# Patient Record
Sex: FEMALE | Race: WHITE | URBAN - METROPOLITAN AREA
[De-identification: names, ages, dates, MRNs, and addresses within clinical notes are randomized per-mention and may not be internally consistent; named-entity substitution may affect disease eponyms.]

---

## 2020-07-30 ENCOUNTER — OFFICE VISIT (OUTPATIENT)
Dept: URBAN - METROPOLITAN AREA CLINIC 40 | Facility: CLINIC | Age: 65
End: 2020-07-30
Payer: COMMERCIAL

## 2020-07-30 DIAGNOSIS — K57.10 SMALL BOWEL DIVERTICULAR DISEASE: ICD-10-CM

## 2020-07-30 DIAGNOSIS — R10.9 ABDOMINAL PAIN: ICD-10-CM

## 2020-07-30 DIAGNOSIS — K22.4 ESOPHAGEAL SPASM: ICD-10-CM

## 2020-07-30 DIAGNOSIS — Z12.11 ENCOUNTER FOR SCREENING COLONOSCOPY: ICD-10-CM

## 2020-07-30 DIAGNOSIS — K57.90 DIVERTICULOSIS: ICD-10-CM

## 2020-07-30 PROCEDURE — 99203 OFFICE O/P NEW LOW 30 MIN: CPT | Performed by: INTERNAL MEDICINE

## 2020-07-30 PROCEDURE — G8427 DOCREV CUR MEDS BY ELIG CLIN: HCPCS | Performed by: INTERNAL MEDICINE

## 2020-07-30 PROCEDURE — 3017F COLORECTAL CA SCREEN DOC REV: CPT | Performed by: INTERNAL MEDICINE

## 2020-07-30 RX ORDER — SODIUM, POTASSIUM,MAG SULFATES 17.5-3.13G
ONCE SOLUTION, RECONSTITUTED, ORAL ORAL
Qty: 1 KIT | Refills: 0 | OUTPATIENT
Start: 2020-07-30 | End: 2020-07-31

## 2020-07-30 RX ORDER — DICYCLOMINE HYDROCHLORIDE 10 MG/1
1 TABLET CAPSULE ORAL BID PRN
Qty: 60 | Refills: 0 | OUTPATIENT
Start: 2020-07-30 | End: 2020-08-29

## 2020-07-30 NOTE — HPI-TODAY'S VISIT:
Ms. Marr is a 64-year-old white female who comes to the office today to schedule screening colonoscopy.  She reports a normal colonoscopy 10 to 15 years ago.  History of bladder cancer and history of rectovaginal repair surgery and hernia surgery, umbilical, years ago.  In this region she notices some distention, likely hernia.  Recent abdominal pain prompted her PCP to order a an upper GI series as well as small bowel follow-through with no acute findings.  Small bowel appeared normal.  However she did have evidence of likely esophageal spasm as well as few diverticula of the small bowel.  No evidence of active inflammation, stricturing, obstruction.  She does report diverticulosis noted on her last colonoscopy.  Recently, complaints of alternating bowel habits with loose and firm stool.  Denies rectal bleeding or melena.  Stressors and anxiety increased lately.  No vomiting, fever or chills.  Denies dysphasia.  Occasiona  heartburn symptoms.  Bloating and gas with midabdominal pain and cramping.  She is a former smoker.  No alcohol consumption.  Takes Kaopectate and Pepto-Bismol as needed for GI upset.  Not currently on any medication for her anxiety.  However, she does take multivitamins as well as medications for her allergies recently. Is also on an omega-3 supplement.  No other complaints.

## 2020-07-30 NOTE — PHYSICAL EXAM GASTROINTESTINAL
Abdomen , soft, nontender, nondistended , no guarding or rigidity , no masses palpable , normal bowel sounds.  Reducible ventral present. Liver and Spleen , no hepatomegaly present , no hepatosplenomegaly , liver nontender , spleen not palpable

## 2020-08-11 ENCOUNTER — OFFICE VISIT (OUTPATIENT)
Dept: URBAN - METROPOLITAN AREA SURGERY CENTER 30 | Facility: SURGERY CENTER | Age: 65
End: 2020-08-11
Payer: COMMERCIAL

## 2020-08-11 DIAGNOSIS — Z12.11 ENCOUNTER FOR COLONOSCOPY DUE TO HISTORY OF ADENOMATOUS COLONIC POLYPS: ICD-10-CM

## 2020-08-11 PROCEDURE — G9935 CANC NOT DETECTD DURING SRCN: HCPCS | Performed by: INTERNAL MEDICINE

## 2020-08-11 PROCEDURE — G8907 PT DOC NO EVENTS ON DISCHARG: HCPCS | Performed by: INTERNAL MEDICINE

## 2020-08-11 PROCEDURE — G0121 COLON CA SCRN NOT HI RSK IND: HCPCS | Performed by: INTERNAL MEDICINE

## 2020-08-31 ENCOUNTER — OFFICE VISIT (OUTPATIENT)
Dept: URBAN - METROPOLITAN AREA CLINIC 40 | Facility: CLINIC | Age: 65
End: 2020-08-31
Payer: COMMERCIAL

## 2020-08-31 ENCOUNTER — DASHBOARD ENCOUNTERS (OUTPATIENT)
Age: 65
End: 2020-08-31

## 2020-08-31 DIAGNOSIS — K64.1 GRADE II HEMORRHOIDS: ICD-10-CM

## 2020-08-31 PROCEDURE — G8419 CALC BMI OUT NRM PARAM NOF/U: HCPCS | Performed by: INTERNAL MEDICINE

## 2020-08-31 PROCEDURE — 3017F COLORECTAL CA SCREEN DOC REV: CPT | Performed by: INTERNAL MEDICINE

## 2020-08-31 PROCEDURE — G8427 DOCREV CUR MEDS BY ELIG CLIN: HCPCS | Performed by: INTERNAL MEDICINE

## 2020-08-31 PROCEDURE — 1036F TOBACCO NON-USER: CPT | Performed by: INTERNAL MEDICINE

## 2020-08-31 PROCEDURE — 99212 OFFICE O/P EST SF 10 MIN: CPT | Performed by: INTERNAL MEDICINE

## 2020-08-31 NOTE — HPI-TODAY'S VISIT:
Ms. Marr is a 64-year-old white female who comes to the office today to schedule screening colonoscopy.  She reports a normal colonoscopy 10 to 15 years ago.  History of bladder cancer and history of rectovaginal repair surgery and hernia surgery, umbilical, years ago.  In this region she notices some distention, likely hernia.  Recent abdominal pain prompted her PCP to order a an upper GI series as well as small bowel follow-through with no acute findings.  Small bowel appeared normal.  However she did have evidence of likely esophageal spasm as well as few diverticula of the small bowel.  No evidence of active inflammation, stricturing, obstruction.  She does report diverticulosis noted on her last colonoscopy.  Recently, complaints of alternating bowel habits with loose and firm stool.  Denies rectal bleeding or melena.  Stressors and anxiety increased lately.  No vomiting, fever or chills.  Denies dysphasia.  Occasiona  heartburn symptoms.  Bloating and gas with midabdominal pain and cramping.  She is a former smoker.  No alcohol consumption.  Takes Kaopectate and Pepto-Bismol as needed for GI upset.  Not currently on any medication for her anxiety.  However, she does take multivitamins as well as medications for her allergies recently. Is also on an omega-3 supplement.  No other complaints.  Colonoscopy negative the other day, minimal diverticulosis only, no polyps or inflammation. She has been trying Florastar.

## 2024-07-24 ENCOUNTER — LAB OUTSIDE AN ENCOUNTER (OUTPATIENT)
Dept: URBAN - METROPOLITAN AREA CLINIC 74 | Facility: CLINIC | Age: 69
End: 2024-07-24

## 2024-07-24 ENCOUNTER — OFFICE VISIT (OUTPATIENT)
Dept: URBAN - METROPOLITAN AREA CLINIC 74 | Facility: CLINIC | Age: 69
End: 2024-07-24
Payer: MEDICARE

## 2024-07-24 VITALS
BODY MASS INDEX: 18.78 KG/M2 | SYSTOLIC BLOOD PRESSURE: 142 MMHG | WEIGHT: 110 LBS | DIASTOLIC BLOOD PRESSURE: 82 MMHG | HEART RATE: 72 BPM | TEMPERATURE: 97.2 F | HEIGHT: 64 IN | OXYGEN SATURATION: 98 %

## 2024-07-24 DIAGNOSIS — K57.10 SMALL BOWEL DIVERTICULAR DISEASE: ICD-10-CM

## 2024-07-24 DIAGNOSIS — R10.13 EPIGASTRIC ABDOMINAL PAIN: ICD-10-CM

## 2024-07-24 DIAGNOSIS — R10.11 ABDOMINAL PAIN, RUQ: ICD-10-CM

## 2024-07-24 PROBLEM — 235919008: Status: ACTIVE | Noted: 2024-07-24

## 2024-07-24 PROCEDURE — 99204 OFFICE O/P NEW MOD 45 MIN: CPT | Performed by: INTERNAL MEDICINE

## 2024-07-24 RX ORDER — SUCRALFATE 1 G/1
1 TABLET ON AN EMPTY STOMACH TABLET ORAL TWICE A DAY
Qty: 60 TABLET | Refills: 3 | OUTPATIENT
Start: 2024-07-24 | End: 2024-11-20

## 2024-07-24 RX ORDER — TRAZODONE HYDROCHLORIDE 100 MG/1
1 TABLET AT BEDTIME TABLET ORAL ONCE A DAY
Status: ON HOLD | COMMUNITY

## 2024-07-24 RX ORDER — PANTOPRAZOLE SODIUM 40 MG/1
1 TABLET TABLET, DELAYED RELEASE ORAL ONCE A DAY
Status: ACTIVE | COMMUNITY

## 2024-07-24 NOTE — PHYSICAL EXAM HENT:
Head, normocephalic, atraumatic, Face, Face within normal limits, Ears, External ears within normal limits, Nose/Nasopharynx, External nose normal appearance, nares patent, no nasal discharge, Mouth and Throat, Oral cavity appearance normal, Lips, Appearance normal Problem: Pain:  Goal: Pain level will decrease  Description: Pain level will decrease  Outcome: Met This Shift  Goal: Control of acute pain  Description: Control of acute pain  Outcome: Met This Shift  Goal: Control of chronic pain  Description: Control of chronic pain  Outcome: Met This Shift

## 2024-07-24 NOTE — PHYSICAL EXAM GASTROINTESTINAL
Abdomen , soft, nontender, nondistended , no guarding or rigidity , no masses palpable , normal bowel sounds , Liver and Spleen , no hepatomegaly present , no hepatosplenomegaly , liver nontender , spleen not palpable <-- Click to add NO significant Past Surgical History

## 2024-07-24 NOTE — HPI-TODAY'S VISIT:
Patient has not been seen since 2020. See below for full discussion but patient presents with a 6-month history of stress, epigastric and right upper quadrant abdominal pain, weight loss.  Her pain is described mainly as a dull crampy pain in the upper abdomen and right upper quadrant, occasionally radiating to the back, usually worse with she eats.  Her  states she only eats berries and broth.  She has lost about 10 to 15 pounds.  She denies nausea vomiting.  Her bowels are essentially within normal limits but occasional diarrhea and constipation consistent with her mixed IBS.  No rectal bleeding or melena.  She is now on Protonix and Zoloft.  She tried trazodone but stopped due to serotonin side effects.  She has been on these medicines only for a few days via PCP.  She was seen at [facility] 2023 for small bowel obstruction.  She was seen by GI specialist at that time as well as surgery.  She had gallstones but no evidence of cholecystitis.  Small liver cyst.  Small bowel obstruction appears to be due to an incarcerated right inguinal hernia.  She was seen by Dr. Cody Martin.  She had laparoscopic inguinal hernia repair by Dr. Thacker at the hospital.  She did well at that time.  She did not have a small bowel resection. -- Labs January 2024, CMP normal, no CBC at that time.  CBC was normal last year 2023.  ======== Narrative & ImpressionDOB:  1955 Study Performed: CT Abdomen and Pelvis with IV contrast. Clinical Indication:  Abdominal pain, acute, nonlocalized, vomiting Comparison:  None. Technique:  There are contiguous images through the abdomen and pelvis after IV contrast. Dose reduction techniques were utilized.  Findings:  Dependent change in the lung bases. No aggressive bony lesions. Period there is some disc bulge seen at L4-5 and there is disc osteophyte at L5-S1. Liver is without biliary dilatation. There are numerous gallstones but there is no gallbladder wall thickening or pericholecystic fluid. Several hypodensities are seen in the liver too small to further characterize The spleen, pancreas, left and right adrenal gland and left and right kidney are normal. No renal stones and no hydronephrosis. No enlarged retroperitoneal nodes. Sclerotic abdominal aortic and iliac calcification. Dilated small bowel loops are seen beginning with some proximal jejunal loops. These loops measure up to 3.9 cm in diameter. The transition point is due to what is likely an incarcerated right inguinal hernia into which small bowel protrudes. There is a dilated knuckle small bowel within the hernia. The transition is leading up to the hernia and the bowel exiting the hernia is decompressed. The distal small bowel is decompressed. No ascites/free fluid. There is diverticulosis of the sigmoid. Colon is collapsed. The appendix is not seen IMPRESSION:.         . SMALL BOWEL OBSTRUCTION WHICH APPEARS TO BE DUE TO AN INCARCERATED RIGHT INGUINAL HERNIA INTO WHICH THE SMALL BOWEL PROTRUDES. DECOMPRESSED SMALL BOWEL DISTAL TO THIS. THE PROXIMAL SMALL BOWEL MEASURES UP TO 3.9 CM IN DIAMETER. NO FREE AIR OR FREE FLUID Gallstones but no CT evidence of cholecystitis Several small hypodensities in the liver seen too small to further characterize Dependent change in the lung bases. Areas of disc bulge at L4-5 and L5-S1 Released By: LUDY NIELSON MD  6/27/2023 2:03 PM ======== --Colonoscopy 2020 negative, minimal diverticulosis only, no polyps or inflammation. Next due 10 years, 2030. --Ms. Marr is a 68-year-old white female with hx of IBS. Prior GI workup with colonoscopy and an upper GI series as well as small bowel follow-through with no acute findings.  Small bowel appeared normal. --Recently, complaints of alternating bowel habits with loose and firm stool.  Denies rectal bleeding or melena.  Stressors and anxiety increased lately.  No vomiting, fever or chills.  Denies dysphasia.  Occasiona  heartburn symptoms.  Bloating and gas with midabdominal pain and cramping.  She is a former smoker.  No alcohol consumption.  Takes Kaopectate and Pepto-Bismol as needed for GI upset.  Not currently on any medication for her anxiety.  However, she does take multivitamins as well as medications for her allergies recently. Is also on an omega-3 supplement.  No other complaints.

## 2024-07-29 ENCOUNTER — TELEPHONE ENCOUNTER (OUTPATIENT)
Dept: URBAN - METROPOLITAN AREA CLINIC 74 | Facility: CLINIC | Age: 69
End: 2024-07-29

## 2024-08-07 ENCOUNTER — TELEPHONE ENCOUNTER (OUTPATIENT)
Dept: URBAN - METROPOLITAN AREA CLINIC 74 | Facility: CLINIC | Age: 69
End: 2024-08-07

## 2024-08-07 RX ORDER — SUCRALFATE 1 G/1
1 TABLET ON AN EMPTY STOMACH TABLET ORAL TWICE A DAY
Qty: 60 TABLET | Refills: 3
Start: 2024-07-24 | End: 2024-12-05

## 2024-08-08 ENCOUNTER — TELEPHONE ENCOUNTER (OUTPATIENT)
Dept: URBAN - METROPOLITAN AREA CLINIC 74 | Facility: CLINIC | Age: 69
End: 2024-08-08

## 2024-08-12 ENCOUNTER — TELEPHONE ENCOUNTER (OUTPATIENT)
Dept: URBAN - METROPOLITAN AREA CLINIC 74 | Facility: CLINIC | Age: 69
End: 2024-08-12

## 2024-08-12 RX ORDER — SCOPOLAMINE 1 MG/3D
1 PATCH TO SKIN BEHIND THE EAR PATCH TRANSDERMAL
Qty: 9 | Refills: 5 | OUTPATIENT
Start: 2024-08-13 | End: 2025-02-08

## 2024-08-19 ENCOUNTER — TELEPHONE ENCOUNTER (OUTPATIENT)
Dept: URBAN - METROPOLITAN AREA CLINIC 74 | Facility: CLINIC | Age: 69
End: 2024-08-19

## 2024-08-30 ENCOUNTER — TELEPHONE ENCOUNTER (OUTPATIENT)
Dept: URBAN - METROPOLITAN AREA CLINIC 74 | Facility: CLINIC | Age: 69
End: 2024-08-30

## 2024-09-04 ENCOUNTER — CLAIMS CREATED FROM THE CLAIM WINDOW (OUTPATIENT)
Dept: URBAN - METROPOLITAN AREA SURGERY CENTER 30 | Facility: SURGERY CENTER | Age: 69
End: 2024-09-04
Payer: MEDICARE

## 2024-09-04 ENCOUNTER — CLAIMS CREATED FROM THE CLAIM WINDOW (OUTPATIENT)
Dept: URBAN - METROPOLITAN AREA CLINIC 4 | Facility: CLINIC | Age: 69
End: 2024-09-04
Payer: MEDICARE

## 2024-09-04 DIAGNOSIS — K29.70 GASTRITIS WITHOUT BLEEDING, UNSPECIFIED CHRONICITY, UNSPECIFIED GASTRITIS TYPE: ICD-10-CM

## 2024-09-04 DIAGNOSIS — R10.13 EPIGASTRIC PAIN: ICD-10-CM

## 2024-09-04 DIAGNOSIS — K31.89 OTHER DISEASES OF STOMACH AND DUODENUM: ICD-10-CM

## 2024-09-04 DIAGNOSIS — R10.13 EPIGASTRIC ABDOMINAL PAIN: ICD-10-CM

## 2024-09-04 DIAGNOSIS — K29.70 GASTRITIS, UNSPECIFIED, WITHOUT BLEEDING: ICD-10-CM

## 2024-09-04 PROCEDURE — 43239 EGD BIOPSY SINGLE/MULTIPLE: CPT | Performed by: INTERNAL MEDICINE

## 2024-09-04 PROCEDURE — 88312 SPECIAL STAINS GROUP 1: CPT | Performed by: PATHOLOGY

## 2024-09-04 PROCEDURE — 00731 ANES UPR GI NDSC PX NOS: CPT | Performed by: NURSE ANESTHETIST, CERTIFIED REGISTERED

## 2024-09-04 PROCEDURE — 88305 TISSUE EXAM BY PATHOLOGIST: CPT | Performed by: PATHOLOGY

## 2024-09-04 RX ORDER — TRAZODONE HYDROCHLORIDE 100 MG/1
1 TABLET AT BEDTIME TABLET ORAL ONCE A DAY
Status: ON HOLD | COMMUNITY

## 2024-09-04 RX ORDER — SUCRALFATE 1 G/1
1 TABLET ON AN EMPTY STOMACH TABLET ORAL TWICE A DAY
Qty: 60 TABLET | Refills: 3 | Status: ACTIVE | COMMUNITY
Start: 2024-07-24 | End: 2024-12-05

## 2024-09-04 RX ORDER — SCOPOLAMINE 1 MG/3D
1 PATCH TO SKIN BEHIND THE EAR PATCH TRANSDERMAL
Qty: 9 | Refills: 5 | Status: ACTIVE | COMMUNITY
Start: 2024-08-13 | End: 2025-02-08

## 2024-09-04 RX ORDER — PANTOPRAZOLE SODIUM 40 MG/1
1 TABLET TABLET, DELAYED RELEASE ORAL ONCE A DAY
Status: ACTIVE | COMMUNITY

## 2024-09-20 ENCOUNTER — TELEPHONE ENCOUNTER (OUTPATIENT)
Dept: URBAN - METROPOLITAN AREA CLINIC 74 | Facility: CLINIC | Age: 69
End: 2024-09-20

## 2025-01-13 ENCOUNTER — OFFICE VISIT (OUTPATIENT)
Dept: URBAN - METROPOLITAN AREA CLINIC 40 | Facility: CLINIC | Age: 70
End: 2025-01-13

## 2025-03-11 PROBLEM — 428882003: Status: ACTIVE | Noted: 2025-03-11

## 2025-03-12 ENCOUNTER — OFFICE VISIT (OUTPATIENT)
Dept: URBAN - METROPOLITAN AREA CLINIC 40 | Facility: CLINIC | Age: 70
End: 2025-03-12
Payer: COMMERCIAL

## 2025-03-12 VITALS
SYSTOLIC BLOOD PRESSURE: 120 MMHG | TEMPERATURE: 98.3 F | HEIGHT: 64 IN | HEART RATE: 91 BPM | WEIGHT: 120 LBS | DIASTOLIC BLOOD PRESSURE: 84 MMHG | BODY MASS INDEX: 20.49 KG/M2

## 2025-03-12 DIAGNOSIS — K90.89 BILE SALT-INDUCED DIARRHEA: ICD-10-CM

## 2025-03-12 DIAGNOSIS — K57.90 DIVERTICULOSIS: ICD-10-CM

## 2025-03-12 DIAGNOSIS — R10.13 EPIGASTRIC ABDOMINAL PAIN: ICD-10-CM

## 2025-03-12 DIAGNOSIS — K57.10 SMALL BOWEL DIVERTICULAR DISEASE: ICD-10-CM

## 2025-03-12 DIAGNOSIS — Z90.49 HISTORY OF CHOLECYSTECTOMY: ICD-10-CM

## 2025-03-12 DIAGNOSIS — R63.4 WEIGHT LOSS: ICD-10-CM

## 2025-03-12 PROBLEM — 69980003: Status: ACTIVE | Noted: 2025-03-12

## 2025-03-12 PROCEDURE — 99214 OFFICE O/P EST MOD 30 MIN: CPT | Performed by: INTERNAL MEDICINE

## 2025-03-12 RX ORDER — PANTOPRAZOLE SODIUM 40 MG/1
1 TABLET TABLET, DELAYED RELEASE ORAL ONCE A DAY
Status: ON HOLD | COMMUNITY

## 2025-03-12 RX ORDER — TRAZODONE HYDROCHLORIDE 100 MG/1
1 TABLET AT BEDTIME TABLET ORAL ONCE A DAY
Status: ON HOLD | COMMUNITY

## 2025-03-12 RX ORDER — COLESTIPOL HYDROCHLORIDE 1 G/1
1 TABLET TABLET, FILM COATED ORAL TWICE A DAY
Qty: 180 TABLET | Refills: 3 | OUTPATIENT
Start: 2025-03-12

## 2025-03-12 NOTE — HPI-TODAY'S VISIT:
- Patient presents for follow-up. Now with "horrible" diarrhea since gallbladder surgery. Loose stools, frequent BM, some FI, all started after aries.   - Follow-up after EGD.  -Recent cholecystectomy, doing well since surgery.  - EGD September 2024, normal esophagus, normal stomach, biopsies negative for H. pylori.  - Colonoscopy 2020 negative, next due 2030.  - GERD.  Doing well on PPI. ======== Prior note 9/2024: See below for full discussion but patient presents with a 6-month history of stress, epigastric and right upper quadrant abdominal pain, weight loss.  Her pain is described mainly as a dull crampy pain in the upper abdomen and right upper quadrant, occasionally radiating to the back, usually worse with she eats.  Her  states she only eats berries and broth.  She has lost about 10 to 15 pounds.  She denies nausea vomiting.  Her bowels are essentially within normal limits but occasional diarrhea and constipation consistent with her mixed IBS.  No rectal bleeding or melena.  She is now on Protonix and Zoloft.  She tried trazodone but stopped due to serotonin side effects.  She has been on these medicines only for a few days via PCP.  She was seen at [facility] 2023 for small bowel obstruction.  She was seen by GI specialist at that time as well as surgery.  She had gallstones but no evidence of cholecystitis.  Small liver cyst.  Small bowel obstruction appears to be due to an incarcerated right inguinal hernia.  She was seen by Dr. Cody Martin.  She had laparoscopic inguinal hernia repair by Dr. Thacker at the hospital.  She did well at that time.  She did not have a small bowel resection. -- Labs January 2024, CMP normal, no CBC at that time.  CBC was normal last year 2023.  ======== Narrative & ImpressionDOB:  1955 Study Performed: CT Abdomen and Pelvis with IV contrast. Clinical Indication:  Abdominal pain, acute, nonlocalized, vomiting Comparison:  None. Technique:  There are contiguous images through the abdomen and pelvis after IV contrast. Dose reduction techniques were utilized.  Findings:  Dependent change in the lung bases. No aggressive bony lesions. Period there is some disc bulge seen at L4-5 and there is disc osteophyte at L5-S1. Liver is without biliary dilatation. There are numerous gallstones but there is no gallbladder wall thickening or pericholecystic fluid. Several hypodensities are seen in the liver too small to further characterize The spleen, pancreas, left and right adrenal gland and left and right kidney are normal. No renal stones and no hydronephrosis. No enlarged retroperitoneal nodes. Sclerotic abdominal aortic and iliac calcification. Dilated small bowel loops are seen beginning with some proximal jejunal loops. These loops measure up to 3.9 cm in diameter. The transition point is due to what is likely an incarcerated right inguinal hernia into which small bowel protrudes. There is a dilated knuckle small bowel within the hernia. The transition is leading up to the hernia and the bowel exiting the hernia is decompressed. The distal small bowel is decompressed. No ascites/free fluid. There is diverticulosis of the sigmoid. Colon is collapsed. The appendix is not seen IMPRESSION:.         . SMALL BOWEL OBSTRUCTION WHICH APPEARS TO BE DUE TO AN INCARCERATED RIGHT INGUINAL HERNIA INTO WHICH THE SMALL BOWEL PROTRUDES. DECOMPRESSED SMALL BOWEL DISTAL TO THIS. THE PROXIMAL SMALL BOWEL MEASURES UP TO 3.9 CM IN DIAMETER. NO FREE AIR OR FREE FLUID Gallstones but no CT evidence of cholecystitis Several small hypodensities in the liver seen too small to further characterize Dependent change in the lung bases. Areas of disc bulge at L4-5 and L5-S1 Released By: LUDY NIELSON MD  6/27/2023 2:03 PM ======== --Colonoscopy 2020 negative, minimal diverticulosis only, no polyps or inflammation. Next due 10 years, 2030. --Ms. Marr is a 68-year-old white female with hx of IBS. Prior GI workup with colonoscopy and an upper GI series as well as small bowel follow-through with no acute findings.  Small bowel appeared normal. --Recently, complaints of alternating bowel habits with loose and firm stool.  Denies rectal bleeding or melena.  Stressors and anxiety increased lately.  No vomiting, fever or chills.  Denies dysphasia.  Occasiona  heartburn symptoms.  Bloating and gas with midabdominal pain and cramping.  She is a former smoker.  No alcohol consumption.  Takes Kaopectate and Pepto-Bismol as needed for GI upset.  Not currently on any medication for her anxiety.  However, she does take multivitamins as well as medications for her allergies recently. Is also on an omega-3 supplement.  No other complaints.

## 2025-03-28 ENCOUNTER — TELEPHONE ENCOUNTER (OUTPATIENT)
Dept: URBAN - METROPOLITAN AREA CLINIC 40 | Facility: CLINIC | Age: 70
End: 2025-03-28

## 2025-06-04 ENCOUNTER — TELEPHONE ENCOUNTER (OUTPATIENT)
Dept: URBAN - METROPOLITAN AREA CLINIC 40 | Facility: CLINIC | Age: 70
End: 2025-06-04

## 2025-06-11 ENCOUNTER — OFFICE VISIT (OUTPATIENT)
Dept: URBAN - METROPOLITAN AREA CLINIC 74 | Facility: CLINIC | Age: 70
End: 2025-06-11
Payer: COMMERCIAL

## 2025-06-11 ENCOUNTER — LAB OUTSIDE AN ENCOUNTER (OUTPATIENT)
Dept: URBAN - METROPOLITAN AREA CLINIC 74 | Facility: CLINIC | Age: 70
End: 2025-06-11

## 2025-06-11 DIAGNOSIS — R63.4 WEIGHT LOSS: ICD-10-CM

## 2025-06-11 DIAGNOSIS — R10.13 EPIGASTRIC ABDOMINAL PAIN: ICD-10-CM

## 2025-06-11 DIAGNOSIS — K57.90 DIVERTICULOSIS: ICD-10-CM

## 2025-06-11 DIAGNOSIS — Z90.49 HISTORY OF CHOLECYSTECTOMY: ICD-10-CM

## 2025-06-11 DIAGNOSIS — K57.10 SMALL BOWEL DIVERTICULAR DISEASE: ICD-10-CM

## 2025-06-11 DIAGNOSIS — K90.89 BILE SALT-INDUCED DIARRHEA: ICD-10-CM

## 2025-06-11 PROCEDURE — 99214 OFFICE O/P EST MOD 30 MIN: CPT | Performed by: INTERNAL MEDICINE

## 2025-06-11 RX ORDER — PANTOPRAZOLE SODIUM 40 MG/1
1 TABLET TABLET, DELAYED RELEASE ORAL ONCE A DAY
Status: ON HOLD | COMMUNITY

## 2025-06-11 RX ORDER — COLESTIPOL HYDROCHLORIDE 1 G/1
1 TABLET TABLET, FILM COATED ORAL TWICE A DAY
Qty: 180 TABLET | Refills: 3 | Status: ACTIVE | COMMUNITY
Start: 2025-03-12

## 2025-06-11 RX ORDER — TRAZODONE HYDROCHLORIDE 100 MG/1
1 TABLET AT BEDTIME TABLET ORAL ONCE A DAY
Status: ON HOLD | COMMUNITY

## 2025-06-11 RX ORDER — COLESTIPOL HYDROCHLORIDE 1 G/1
1 TABLET TABLET, FILM COATED ORAL TWICE A DAY
Qty: 180 TABLET | Refills: 3 | OUTPATIENT
Start: 2025-06-10

## 2025-06-11 NOTE — HPI-TODAY'S VISIT:
- Follow up visit for bile salt diarrhea. Pt seen 3/2025 and Rx for Colestipol bid, s/s persisted, thus Lomotil was called in. - Patient presents for follow-up. Now with "horrible" diarrhea since gallbladder surgery. Loose stools, frequent BM, some FI, all started after aries.   -Recent cholecystectomy, doing well since surgery.  - EGD September 2024, normal esophagus, normal stomach, biopsies negative for H. pylori.  - Colonoscopy 2020 negative, next due 2030.  - GERD.  Doing well on PPI. ======== Prior note 9/2024: See below for full discussion but patient presents with a 6-month history of stress, epigastric and right upper quadrant abdominal pain, weight loss.  Her pain is described mainly as a dull crampy pain in the upper abdomen and right upper quadrant, occasionally radiating to the back, usually worse with she eats.  Her  states she only eats berries and broth.  She has lost about 10 to 15 pounds.  She denies nausea vomiting.  Her bowels are essentially within normal limits but occasional diarrhea and constipation consistent with her mixed IBS.  No rectal bleeding or melena.  She is now on Protonix and Zoloft.  She tried trazodone but stopped due to serotonin side effects.  She has been on these medicines only for a few days via PCP.  She was seen at [facility] 2023 for small bowel obstruction.  She was seen by GI specialist at that time as well as surgery.  She had gallstones but no evidence of cholecystitis.  Small liver cyst.  Small bowel obstruction appears to be due to an incarcerated right inguinal hernia.  She was seen by Dr. Cody Martin.  She had laparoscopic inguinal hernia repair by Dr. Thacker at the hospital.  She did well at that time.  She did not have a small bowel resection. -- Labs January 2024, CMP normal, no CBC at that time.  CBC was normal last year 2023.  ======== Narrative & ImpressionDOB:  1955 Study Performed: CT Abdomen and Pelvis with IV contrast. Clinical Indication:  Abdominal pain, acute, nonlocalized, vomiting Comparison:  None. Technique:  There are contiguous images through the abdomen and pelvis after IV contrast. Dose reduction techniques were utilized.  Findings:  Dependent change in the lung bases. No aggressive bony lesions. Period there is some disc bulge seen at L4-5 and there is disc osteophyte at L5-S1. Liver is without biliary dilatation. There are numerous gallstones but there is no gallbladder wall thickening or pericholecystic fluid. Several hypodensities are seen in the liver too small to further characterize The spleen, pancreas, left and right adrenal gland and left and right kidney are normal. No renal stones and no hydronephrosis. No enlarged retroperitoneal nodes. Sclerotic abdominal aortic and iliac calcification. Dilated small bowel loops are seen beginning with some proximal jejunal loops. These loops measure up to 3.9 cm in diameter. The transition point is due to what is likely an incarcerated right inguinal hernia into which small bowel protrudes. There is a dilated knuckle small bowel within the hernia. The transition is leading up to the hernia and the bowel exiting the hernia is decompressed. The distal small bowel is decompressed. No ascites/free fluid. There is diverticulosis of the sigmoid. Colon is collapsed. The appendix is not seen IMPRESSION:.         . SMALL BOWEL OBSTRUCTION WHICH APPEARS TO BE DUE TO AN INCARCERATED RIGHT INGUINAL HERNIA INTO WHICH THE SMALL BOWEL PROTRUDES. DECOMPRESSED SMALL BOWEL DISTAL TO THIS. THE PROXIMAL SMALL BOWEL MEASURES UP TO 3.9 CM IN DIAMETER. NO FREE AIR OR FREE FLUID Gallstones but no CT evidence of cholecystitis Several small hypodensities in the liver seen too small to further characterize Dependent change in the lung bases. Areas of disc bulge at L4-5 and L5-S1 Released By: LUDY NIELSON MD  6/27/2023 2:03 PM ======== --Colonoscopy 2020 negative, minimal diverticulosis only, no polyps or inflammation. Next due 10 years, 2030. --Ms. Marr is a 68-year-old white female with hx of IBS. Prior GI workup with colonoscopy and an upper GI series as well as small bowel follow-through with no acute findings.  Small bowel appeared normal. --Recently, complaints of alternating bowel habits with loose and firm stool.  Denies rectal bleeding or melena.  Stressors and anxiety increased lately.  No vomiting, fever or chills.  Denies dysphasia.  Occasiona  heartburn symptoms.  Bloating and gas with midabdominal pain and cramping.  She is a former smoker.  No alcohol consumption.  Takes Kaopectate and Pepto-Bismol as needed for GI upset.  Not currently on any medication for her anxiety.  However, she does take multivitamins as well as medications for her allergies recently. Is also on an omega-3 supplement.  No other complaints.

## 2025-06-12 ENCOUNTER — LAB OUTSIDE AN ENCOUNTER (OUTPATIENT)
Dept: URBAN - METROPOLITAN AREA CLINIC 40 | Facility: CLINIC | Age: 70
End: 2025-06-12

## 2025-06-16 LAB
ADENOVIRUS F 40/41: NOT DETECTED
CAMPYLOBACTER: NOT DETECTED
CLOSTRIDIUM DIFFICILE: NOT DETECTED
ENTAMOEBA HISTOLYTICA: NOT DETECTED
ENTEROAGGREGATIVE E.COLI: NOT DETECTED
ENTEROTOXIGENIC E.COLI: NOT DETECTED
ESCHERICHIA COLI O157: NOT DETECTED
GIARDIA LAMBLIA: NOT DETECTED
NOROVIRUS GI/GII: NOT DETECTED
PANCREATICELASTASE ELISA, STOOL: (no result)
ROTAVIRUS A: NOT DETECTED
SALMONELLA SPP.: NOT DETECTED
SHIGA-LIKE TOXIN PRODUCING E.COLI: NOT DETECTED
SHIGELLA SPP. / ENTEROINVASIVE E.COLI: NOT DETECTED
VIBRIO PARAHAEMOLYTICUS: NOT DETECTED
VIBRIO SPP.: NOT DETECTED
YERSINIA ENTEROCOLITICA: NOT DETECTED

## 2025-06-23 ENCOUNTER — TELEPHONE ENCOUNTER (OUTPATIENT)
Dept: URBAN - METROPOLITAN AREA CLINIC 74 | Facility: CLINIC | Age: 70
End: 2025-06-23

## 2025-07-01 ENCOUNTER — TELEPHONE ENCOUNTER (OUTPATIENT)
Dept: URBAN - METROPOLITAN AREA CLINIC 74 | Facility: CLINIC | Age: 70
End: 2025-07-01

## 2025-07-03 ENCOUNTER — CLAIMS CREATED FROM THE CLAIM WINDOW (OUTPATIENT)
Dept: URBAN - METROPOLITAN AREA SURGERY CENTER 30 | Facility: SURGERY CENTER | Age: 70
End: 2025-07-03
Payer: COMMERCIAL

## 2025-07-03 ENCOUNTER — CLAIMS CREATED FROM THE CLAIM WINDOW (OUTPATIENT)
Dept: URBAN - METROPOLITAN AREA CLINIC 4 | Facility: CLINIC | Age: 70
End: 2025-07-03
Payer: COMMERCIAL

## 2025-07-03 ENCOUNTER — TELEPHONE ENCOUNTER (OUTPATIENT)
Dept: URBAN - METROPOLITAN AREA CLINIC 40 | Facility: CLINIC | Age: 70
End: 2025-07-03

## 2025-07-03 DIAGNOSIS — K63.89 OTHER SPECIFIED DISEASES OF INTESTINE: ICD-10-CM

## 2025-07-03 DIAGNOSIS — Z83.719 FAMILY HISTORY OF COLONIC POLYPS: ICD-10-CM

## 2025-07-03 DIAGNOSIS — R19.7 ACUTE DIARRHEA: ICD-10-CM

## 2025-07-03 PROCEDURE — 00811 ANES LWR INTST NDSC NOS: CPT | Performed by: NURSE ANESTHETIST, CERTIFIED REGISTERED

## 2025-07-03 PROCEDURE — 88305 TISSUE EXAM BY PATHOLOGIST: CPT | Performed by: PATHOLOGY

## 2025-07-03 PROCEDURE — 88313 SPECIAL STAINS GROUP 2: CPT | Performed by: PATHOLOGY

## 2025-07-03 PROCEDURE — 45380 COLONOSCOPY AND BIOPSY: CPT | Performed by: INTERNAL MEDICINE

## 2025-07-03 PROCEDURE — 88342 IMHCHEM/IMCYTCHM 1ST ANTB: CPT | Performed by: PATHOLOGY

## 2025-07-03 RX ORDER — TRAZODONE HYDROCHLORIDE 100 MG/1
1 TABLET AT BEDTIME TABLET ORAL ONCE A DAY
Status: ON HOLD | COMMUNITY

## 2025-07-03 RX ORDER — COLESTIPOL HYDROCHLORIDE 1 G/1
1 TABLET TABLET, FILM COATED ORAL TWICE A DAY
Qty: 180 TABLET | Refills: 3 | Status: ACTIVE | COMMUNITY
Start: 2025-06-10

## 2025-07-03 RX ORDER — PANTOPRAZOLE SODIUM 40 MG/1
1 TABLET TABLET, DELAYED RELEASE ORAL ONCE A DAY
Status: ON HOLD | COMMUNITY

## 2025-07-03 RX ORDER — MESALAMINE 1.2 G/1
4 TABLET, DELAYED RELEASE ORAL ONCE A DAY
Qty: 120 | Refills: 5 | OUTPATIENT
Start: 2025-07-03

## 2025-07-07 ENCOUNTER — TELEPHONE ENCOUNTER (OUTPATIENT)
Dept: URBAN - METROPOLITAN AREA CLINIC 74 | Facility: CLINIC | Age: 70
End: 2025-07-07

## 2025-07-07 RX ORDER — SULFASALAZINE 500 MG/1
2 TABLET ORAL BID
Qty: 120 | Refills: 5 | OUTPATIENT
Start: 2025-07-08 | End: 2026-01-04

## 2025-07-09 ENCOUNTER — TELEPHONE ENCOUNTER (OUTPATIENT)
Dept: URBAN - METROPOLITAN AREA CLINIC 74 | Facility: CLINIC | Age: 70
End: 2025-07-09

## 2025-07-21 ENCOUNTER — OFFICE VISIT (OUTPATIENT)
Dept: URBAN - METROPOLITAN AREA CLINIC 74 | Facility: CLINIC | Age: 70
End: 2025-07-21
Payer: COMMERCIAL

## 2025-07-21 DIAGNOSIS — R10.13 EPIGASTRIC ABDOMINAL PAIN: ICD-10-CM

## 2025-07-21 DIAGNOSIS — K57.10 SMALL BOWEL DIVERTICULAR DISEASE: ICD-10-CM

## 2025-07-21 DIAGNOSIS — K57.90 DIVERTICULOSIS: ICD-10-CM

## 2025-07-21 DIAGNOSIS — K90.89 BILE SALT-INDUCED DIARRHEA: ICD-10-CM

## 2025-07-21 DIAGNOSIS — R63.4 WEIGHT LOSS: ICD-10-CM

## 2025-07-21 DIAGNOSIS — Z90.49 HISTORY OF CHOLECYSTECTOMY: ICD-10-CM

## 2025-07-21 PROCEDURE — 99214 OFFICE O/P EST MOD 30 MIN: CPT | Performed by: INTERNAL MEDICINE

## 2025-07-21 RX ORDER — PANTOPRAZOLE SODIUM 40 MG/1
1 TABLET TABLET, DELAYED RELEASE ORAL ONCE A DAY
Status: ON HOLD | COMMUNITY

## 2025-07-21 RX ORDER — COLESTIPOL HYDROCHLORIDE 1 G/1
1 TABLET TABLET, FILM COATED ORAL TWICE A DAY
Qty: 180 TABLET | Refills: 3 | OUTPATIENT
Start: 2025-07-21

## 2025-07-21 RX ORDER — MESALAMINE 1.2 G/1
4 TABLET, DELAYED RELEASE ORAL ONCE A DAY
Qty: 120 | Refills: 5 | Status: ACTIVE | COMMUNITY
Start: 2025-07-03

## 2025-07-21 RX ORDER — SULFASALAZINE 500 MG/1
2 TABLET ORAL BID
Qty: 120 | Refills: 5 | Status: ACTIVE | COMMUNITY
Start: 2025-07-08 | End: 2026-01-04

## 2025-07-21 RX ORDER — TRAZODONE HYDROCHLORIDE 100 MG/1
1 TABLET AT BEDTIME TABLET ORAL ONCE A DAY
Status: ON HOLD | COMMUNITY

## 2025-07-21 RX ORDER — COLESTIPOL HYDROCHLORIDE 1 G/1
1 TABLET TABLET, FILM COATED ORAL TWICE A DAY
Qty: 180 TABLET | Refills: 3 | Status: ACTIVE | COMMUNITY
Start: 2025-06-10

## 2025-07-21 NOTE — HPI-TODAY'S VISIT:
- Follow up visit for bile salt diarrhea. Pt seen 3/2025 and Rx for Colestipol bid, s/s persisted, Lomotil was called in but no relief. See below, she is now 100% better and c/o constipation with BM every 2 days. - Loose stools, frequent BM, some FI, all started after aries.  - Colonoscopy 7/2025 negative for IBD and SCAD, bx negative for microscopic colitis. TI normal. - Rx: We started Mesalamine (changed to Sulfasalzine for cost) emperically to see if s/s of diarrhea improved. She took it 2 days and stopped due to constipation. -Recent cholecystectomy, doing well since surgery. - EGD September 2024, normal esophagus, normal stomach, biopsies negative for H. pylori. - Colonoscopy 2020 negative, next due 2030. - GERD.  Doing well on PPI. ======== Prior note 9/2024: See below for full discussion but patient presents with a 6-month history of stress, epigastric and right upper quadrant abdominal pain, weight loss.  Her pain is described mainly as a dull crampy pain in the upper abdomen and right upper quadrant, occasionally radiating to the back, usually worse with she eats.  Her  states she only eats berries and broth.  She has lost about 10 to 15 pounds.  She denies nausea vomiting.  Her bowels are essentially within normal limits but occasional diarrhea and constipation consistent with her mixed IBS.  No rectal bleeding or melena.  She is now on Protonix and Zoloft.  She tried trazodone but stopped due to serotonin side effects.  She has been on these medicines only for a few days via PCP.  She was seen at [facility] 2023 for small bowel obstruction.  She was seen by GI specialist at that time as well as surgery.  She had gallstones but no evidence of cholecystitis.  Small liver cyst.  Small bowel obstruction appears to be due to an incarcerated right inguinal hernia.  She was seen by Dr. Cody Martin.  She had laparoscopic inguinal hernia repair by Dr. Thacker at the hospital.  She did well at that time.  She did not have a small bowel resection. -- Labs January 2024, CMP normal, no CBC at that time.  CBC was normal last year 2023.  ======== Narrative & ImpressionDOB:  1955 Study Performed: CT Abdomen and Pelvis with IV contrast. Clinical Indication:  Abdominal pain, acute, nonlocalized, vomiting Comparison:  None. Technique:  There are contiguous images through the abdomen and pelvis after IV contrast. Dose reduction techniques were utilized.  Findings:  Dependent change in the lung bases. No aggressive bony lesions. Period there is some disc bulge seen at L4-5 and there is disc osteophyte at L5-S1. Liver is without biliary dilatation. There are numerous gallstones but there is no gallbladder wall thickening or pericholecystic fluid. Several hypodensities are seen in the liver too small to further characterize The spleen, pancreas, left and right adrenal gland and left and right kidney are normal. No renal stones and no hydronephrosis. No enlarged retroperitoneal nodes. Sclerotic abdominal aortic and iliac calcification. Dilated small bowel loops are seen beginning with some proximal jejunal loops. These loops measure up to 3.9 cm in diameter. The transition point is due to what is likely an incarcerated right inguinal hernia into which small bowel protrudes. There is a dilated knuckle small bowel within the hernia. The transition is leading up to the hernia and the bowel exiting the hernia is decompressed. The distal small bowel is decompressed. No ascites/free fluid. There is diverticulosis of the sigmoid. Colon is collapsed. The appendix is not seen IMPRESSION:.         . SMALL BOWEL OBSTRUCTION WHICH APPEARS TO BE DUE TO AN INCARCERATED RIGHT INGUINAL HERNIA INTO WHICH THE SMALL BOWEL PROTRUDES. DECOMPRESSED SMALL BOWEL DISTAL TO THIS. THE PROXIMAL SMALL BOWEL MEASURES UP TO 3.9 CM IN DIAMETER. NO FREE AIR OR FREE FLUID Gallstones but no CT evidence of cholecystitis Several small hypodensities in the liver seen too small to further characterize Dependent change in the lung bases. Areas of disc bulge at L4-5 and L5-S1 Released By: LUDY NIELSON MD  6/27/2023 2:03 PM ======== --Colonoscopy 2020 negative, minimal diverticulosis only, no polyps or inflammation. Next due 10 years, 2030. --Ms. Marr is a 68-year-old white female with hx of IBS. Prior GI workup with colonoscopy and an upper GI series as well as small bowel follow-through with no acute findings.  Small bowel appeared normal. --Recently, complaints of alternating bowel habits with loose and firm stool.  Denies rectal bleeding or melena.  Stressors and anxiety increased lately.  No vomiting, fever or chills.  Denies dysphasia.  Occasiona  heartburn symptoms.  Bloating and gas with midabdominal pain and cramping.  She is a former smoker.  No alcohol consumption.  Takes Kaopectate and Pepto-Bismol as needed for GI upset.  Not currently on any medication for her anxiety.  However, she does take multivitamins as well as medications for her allergies recently. Is also on an omega-3 supplement.  No other complaints.